# Patient Record
(demographics unavailable — no encounter records)

---

## 2024-12-03 NOTE — HISTORY OF PRESENT ILLNESS
[Xray (Date:___)] : [unfilled] Xray -  [Doing Well] : is doing well [No Sign of Infection] : is showing no signs of infection [Adequate Pain Control] : has adequate pain control [de-identified] : s/p Left total knee arthroplasty. Computer-assisted tibia resection, OrthAlign procedure DOS:11/08/24  [de-identified] : Patient is 3 weeks from left total knee arthroplasty his pain is controlled with hydromorphone 2 times a day he is walking with a cane he is ready to start outpatient physical therapy [de-identified] : Left knee exam shows healing incision with no sign of infection, ROM 0 to 100 degrees. The surgical incision site(s) swollen, but clean, dry and intact, healed, not erythematous and not dehisced. Additional findings included an unremarkable neurological exam, peripheral vascular exam normal and maneuvers demonstrated a negative Compa's sign.   [de-identified] :  3V xray of the left knee done in the office today and reviewed and demonstrates s/p implants in good positioning with no evidence of wear, loosening, or subsidence.   [de-identified] : Patient will start out pt physical therapy and low impact exercise. continue elevated, ice, and pain management.   Continue DVTP medication.    follow-up in  6 weeks

## 2025-01-22 NOTE — HISTORY OF PRESENT ILLNESS
[1] : the patient reports pain that is 1/10 in severity [Clean/Dry/Intact] : clean, dry and intact [Healed] : healed [Neuro Intact] : an unremarkable neurological exam [Vascular Intact] : ~T peripheral vascular exam normal [Negative Compa's] : maneuvers demonstrated a negative Compa's sign [Xray (Date:___)] : [unfilled] Xray -  [Doing Well] : is doing well [Excellent Pain Control] : has excellent pain control [No Sign of Infection] : is showing no signs of infection [Chills] : no chills [Constipation] : no constipation [Diarrhea] : no diarrhea [Dysuria] : no dysuria [Fever] : no fever [Nausea] : no nausea [Vomiting] : no vomiting [Erythema] : not erythematous [Discharge] : absent of discharge [Swelling] : not swollen [Dehiscence] : not dehisced [de-identified] : s/p Left total knee arthroplasty. Computer-assisted tibia resection, OrthAlign procedure DOS:11/08/24. [de-identified] : This is a 73 year old M pt presents today s/p 11 weeks from left TKA. Patient continues to feel soreness and mild pain over the lateral aspect of the knee. Otherwise, patient is doing well. He continues to go to physical therapy. Pt is ambulating without use of any assistance device. He takes Naproxen at night. Patient is planning to go to [place] in February. [de-identified] : Left knee exam shows healed incision with no sign of infection. ROM 2-115 [de-identified] : 3V xray of the left knee done in office today and reviewed by Dr. Torito Hansen demonstrates s/p left knee implants in good positioning with no evidence of wear, loosening, or subsidence. [de-identified] : Continue physical therapy and low impact exercises. Pt understands the importance of prophylaxis for invasive dental procedures. F/u in 6-8 weeks for re-evaluation.

## 2025-01-22 NOTE — END OF VISIT
[FreeTextEntry3] : Documented by Merna Stokes acting solely as a scribe for Dr. Torito Hansen on this date 01/22/2025.   All Medical record entries made by the Scribe were at my, Dr. Torito Hansen's, direction and personally dictated by me on 01/22/2025. I have reviewed the chart and agree that the record accurately reflects my personal performance of the history, physical exam, assessment and plan. I have also personally directed, reviewed, and agreed with the discharge instructions.

## 2025-04-02 NOTE — END OF VISIT
[FreeTextEntry3] : Documented by Merna Stokes acting solely as a scribe for Dr. Torito Hansen on this date 04/02/2025.   All Medical record entries made by the Scribe were at my, Dr. Torito Hansen's, direction and personally dictated by me on 04/02/2025. I have reviewed the chart and agree that the record accurately reflects my personal performance of the history, physical exam, assessment and plan. I have also personally directed, reviewed, and agreed with the discharge instructions.

## 2025-04-02 NOTE — DISCUSSION/SUMMARY
[Medication Risks Reviewed] : Medication risks reviewed [de-identified] : This is a 73 year old M pt presents today for evaluation of bilateral knees.  Left knee: Patient is status post left total knee arthroplasty on 11/8/2024. He is overall doing well. I reassured the pt that his implants are stable with no evidence of loosening or wear. Pt understands the importance of prophylaxis for invasive dental procedures. He should continue to do low impact exercises. F/u with us a year from surgery for radiographic surveillance.  Right knee: Patient has moderate medial and patellofemoral compartmental osteoarthritis of the right knee. The nature of condition and treatment options were discussed in detail. Patient is not a candidate for right TKA. I discussed conservative treatment such as cortisone injections, HA injections, PT, anti-inflammatories, and low impact exercise. The pt opted for right knee cortisone injection which he tolerated well. He should continue to do low impact exercises. He may take Tylenol and NSAIDs as needed. F/u in 3 months for re-evaluation.

## 2025-04-02 NOTE — PROCEDURE
[de-identified] : I injected the patient's right knee today with cortisone for primary osteoarthritis.  I discussed at length with the patient the planned steroid and lidocaine injection. The risks, benefits, convalescence and alternatives were reviewed. The possible side effects discussed included but were not limited to: pain, swelling, heat, bleeding, and redness. Symptoms are generally mild but if they are extensive then contact the office. Giving pain relievers by mouth such as NSAIDs or Tylenol can generally treat the reactions to steroid and lidocaine. Rare cases of infection have been noted. Rash, hives and itching may occur post injection. If you have muscle pain or cramps, flushing and or swelling of the face, rapid heart beat, nausea, dizziness, fever, chills, headache, difficulty breathing, swelling in the arms or legs, or have a prickly feeling of your skin, contact a health care provider immediately. Following this discussion, the knee was prepped with Alcohol and under sterile condition the 80 mg Depo-Medrol and 6 cc Lidocaine injection was performed with a 20 gauge needle through a superolateral injection site. The needle was introduced into the joint, aspiration was performed to ensure intra-articular placement and the medication was injected. Upon withdrawal of the needle the site was cleaned with alcohol and a band aid applied. The patient tolerated the injection well and there were no adverse effects. Post injection instructions included no strenuous activity for 24 hours, cryotherapy and if there are any adverse effects to contact the office.

## 2025-04-02 NOTE — PHYSICAL EXAM
[Normal] : Gait: normal [de-identified] : GENERAL APPEARANCE: Well nourished and hydrated, pleasant, alert, and oriented x 3. Appears their stated age.  HEENT: Normocephalic, extraocular eye motion intact. Nasal septum midline. Oral cavity clear. External auditory canal clear.  RESPIRATORY: Breath sounds clear and audible in all lobes. No wheezing, No accessory muscle use. CARDIOVASCULAR: No apparent abnormalities. No lower leg edema. No varicosities. Pedal pulses are palpable. NEUROLOGIC: Sensation is normal, no muscle weakness in the upper or lower extremities. DERMATOLOGIC: No apparent skin lesions, moist, warm, no rash. SPINE: Cervical spine appears normal and moves freely; thoracic spine appears normal and moves freely; lumbosacral spine appears normal and moves freely, normal, nontender. MUSCULOSKELETAL: Hands, wrists, and elbows are normal and move freely, shoulders are normal and move freely.  PSYCHIATRIC: Oriented to person, place, and time, insight and judgement were intact and the affect was normal.    Examination of the right knee reveals no joint effusion, range of motion is 0 to 120 degrees of knee flexion, no gross instability, there is mild medial joint line tenderness and positive patellofemoral grind.  5 out of 5 strength.    Examination left knee reveals a well-healed surgical incision, no joint effusion, range of motion 0 to 120 degrees of flexion, no gross instability, 5 out of 5 strength. [de-identified] : 3V xray of the left knee done in office today and reviewed by Dr. Torito Hansen demonstrates s/p left knee implants in good positioning with no evidence of wear, loosening, or subsidence.  4V xray of the right knee done in the office today and reviewed by Dr. Torito Hansen demonstrates moderate medial and patellofemoral compartmental osteoarthritis.

## 2025-04-02 NOTE — HISTORY OF PRESENT ILLNESS
[de-identified] : Segundo is a 73-year-old male that does present today for follow-up evaluation status post left total knee arthroplasty on 11/8/2024, as well as initial evaluation of a new complaint of right knee pain. Regarding the left knee, he states that his pain-free range of motion is moderately improved. He states that the medial aspect pain has gone after the surgery. 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  However, this has been slowly improving over time.  He reports greater ease with regard to squatting or going up and down stairs.  Regarding the right knee, over the past month or so he has noted intermittent, mild to moderate diffuse dull aching pain about the knee.  Exacerbated when standing after prolonged sitting or sometimes with prolonged standing or walking.  No mechanical symptoms or instability.  No radicular pain or paresthesia.  Treatment has consisted of activity modification and use of over-the-counter medications without significant relief.